# Patient Record
Sex: MALE | Race: WHITE | NOT HISPANIC OR LATINO | Employment: OTHER | URBAN - METROPOLITAN AREA
[De-identification: names, ages, dates, MRNs, and addresses within clinical notes are randomized per-mention and may not be internally consistent; named-entity substitution may affect disease eponyms.]

---

## 2017-09-16 ENCOUNTER — HOSPITAL ENCOUNTER (OUTPATIENT)
Age: 77
Discharge: HOME | End: 2017-09-16
Attending: EMERGENCY MEDICINE

## 2017-09-16 VITALS
WEIGHT: 179 LBS | TEMPERATURE: 98.2 F | BODY MASS INDEX: 26.51 KG/M2 | RESPIRATION RATE: 16 BRPM | HEART RATE: 68 BPM | DIASTOLIC BLOOD PRESSURE: 84 MMHG | OXYGEN SATURATION: 96 % | HEIGHT: 69 IN | SYSTOLIC BLOOD PRESSURE: 149 MMHG

## 2017-09-16 DIAGNOSIS — L03.90 CELLULITIS, UNSPECIFIED CELLULITIS SITE: ICD-10-CM

## 2017-09-16 PROCEDURE — 99203 OFFICE O/P NEW LOW 30 MIN: CPT | Performed by: EMERGENCY MEDICINE

## 2017-09-16 PROCEDURE — 99201 HB OP SERV MINOR ACUITY-NEW PT: CPT

## 2017-09-16 RX ORDER — CEPHALEXIN 500 MG/1
500 CAPSULE ORAL 3 TIMES DAILY
Qty: 15 CAPSULE | Refills: 0 | Status: SHIPPED | OUTPATIENT
Start: 2017-09-16

## 2017-09-16 RX ORDER — PREDNISONE 20 MG/1
40 TABLET ORAL DAILY
Qty: 4 TABLET | Refills: 0 | Status: SHIPPED | OUTPATIENT
Start: 2017-09-16

## 2017-09-16 ASSESSMENT — PAIN SCALES - GENERAL: PAINLEVEL_OUTOF10: 0

## 2018-09-04 ENCOUNTER — HOSPITAL ENCOUNTER (EMERGENCY)
Facility: CLINIC | Age: 78
Discharge: HOME | End: 2018-09-04
Attending: EMERGENCY MEDICINE | Admitting: EMERGENCY MEDICINE
Payer: MEDICARE

## 2018-09-04 VITALS
WEIGHT: 180 LBS | DIASTOLIC BLOOD PRESSURE: 77 MMHG | HEIGHT: 69 IN | OXYGEN SATURATION: 97 % | SYSTOLIC BLOOD PRESSURE: 133 MMHG | BODY MASS INDEX: 26.66 KG/M2 | TEMPERATURE: 97.4 F | HEART RATE: 67 BPM | RESPIRATION RATE: 16 BRPM

## 2018-09-04 DIAGNOSIS — K57.32 DIVERTICULITIS OF COLON: ICD-10-CM

## 2018-09-04 LAB
ALBUMIN SERPL-MCNC: 4 G/DL (ref 3.4–5)
ALBUMIN UR-MCNC: 10 MG/DL
ALP SERPL-CCNC: 116 U/L (ref 40–150)
ALT SERPL W P-5'-P-CCNC: 93 U/L (ref 0–70)
ANION GAP SERPL CALCULATED.3IONS-SCNC: 10 MMOL/L (ref 3–14)
APPEARANCE UR: CLEAR
AST SERPL W P-5'-P-CCNC: 91 U/L (ref 0–45)
BASOPHILS # BLD AUTO: 0 10E9/L (ref 0–0.2)
BASOPHILS NFR BLD AUTO: 0.1 %
BILIRUB SERPL-MCNC: 0.7 MG/DL (ref 0.2–1.3)
BILIRUB UR QL STRIP: NEGATIVE
BUN SERPL-MCNC: 21 MG/DL (ref 7–30)
CALCIUM SERPL-MCNC: 9.1 MG/DL (ref 8.5–10.1)
CHLORIDE SERPL-SCNC: 109 MMOL/L (ref 94–109)
CO2 SERPL-SCNC: 26 MMOL/L (ref 20–32)
COLOR UR AUTO: YELLOW
CREAT SERPL-MCNC: 1.08 MG/DL (ref 0.66–1.25)
DIFFERENTIAL METHOD BLD: NORMAL
EOSINOPHIL # BLD AUTO: 0.1 10E9/L (ref 0–0.7)
EOSINOPHIL NFR BLD AUTO: 1.4 %
ERYTHROCYTE [DISTWIDTH] IN BLOOD BY AUTOMATED COUNT: 12.3 % (ref 10–15)
GFR SERPL CREATININE-BSD FRML MDRD: 66 ML/MIN/1.7M2
GLUCOSE SERPL-MCNC: 97 MG/DL (ref 70–99)
GLUCOSE UR STRIP-MCNC: NEGATIVE MG/DL
HCT VFR BLD AUTO: 46.5 % (ref 40–53)
HGB BLD-MCNC: 16 G/DL (ref 13.3–17.7)
HGB UR QL STRIP: NEGATIVE
IMM GRANULOCYTES # BLD: 0 10E9/L (ref 0–0.4)
IMM GRANULOCYTES NFR BLD: 0.2 %
KETONES UR STRIP-MCNC: 10 MG/DL
LACTATE BLD-SCNC: 1.4 MMOL/L (ref 0.7–2)
LEUKOCYTE ESTERASE UR QL STRIP: NEGATIVE
LIPASE SERPL-CCNC: 138 U/L (ref 73–393)
LYMPHOCYTES # BLD AUTO: 1.7 10E9/L (ref 0.8–5.3)
LYMPHOCYTES NFR BLD AUTO: 20.2 %
MCH RBC QN AUTO: 32.6 PG (ref 26.5–33)
MCHC RBC AUTO-ENTMCNC: 34.4 G/DL (ref 31.5–36.5)
MCV RBC AUTO: 95 FL (ref 78–100)
MONOCYTES # BLD AUTO: 1 10E9/L (ref 0–1.3)
MONOCYTES NFR BLD AUTO: 11.9 %
MUCOUS THREADS #/AREA URNS LPF: PRESENT /LPF
NEUTROPHILS # BLD AUTO: 5.6 10E9/L (ref 1.6–8.3)
NEUTROPHILS NFR BLD AUTO: 66.2 %
NITRATE UR QL: NEGATIVE
NRBC # BLD AUTO: 0 10*3/UL
NRBC BLD AUTO-RTO: 0 /100
PH UR STRIP: 6 PH (ref 5–7)
PLATELET # BLD AUTO: 200 10E9/L (ref 150–450)
POTASSIUM SERPL-SCNC: 3.5 MMOL/L (ref 3.4–5.3)
PROT SERPL-MCNC: 7.4 G/DL (ref 6.8–8.8)
RBC # BLD AUTO: 4.91 10E12/L (ref 4.4–5.9)
RBC #/AREA URNS AUTO: 1 /HPF (ref 0–2)
SODIUM SERPL-SCNC: 145 MMOL/L (ref 133–144)
SOURCE: ABNORMAL
SP GR UR STRIP: 1.01 (ref 1–1.03)
SQUAMOUS #/AREA URNS AUTO: <1 /HPF (ref 0–1)
UROBILINOGEN UR STRIP-MCNC: NORMAL MG/DL (ref 0–2)
WBC # BLD AUTO: 8.4 10E9/L (ref 4–11)
WBC #/AREA URNS AUTO: 1 /HPF (ref 0–5)

## 2018-09-04 PROCEDURE — 25000132 ZZH RX MED GY IP 250 OP 250 PS 637: Mod: GY | Performed by: EMERGENCY MEDICINE

## 2018-09-04 PROCEDURE — 25000128 H RX IP 250 OP 636: Performed by: EMERGENCY MEDICINE

## 2018-09-04 PROCEDURE — 96360 HYDRATION IV INFUSION INIT: CPT

## 2018-09-04 PROCEDURE — 83690 ASSAY OF LIPASE: CPT | Performed by: EMERGENCY MEDICINE

## 2018-09-04 PROCEDURE — 81001 URINALYSIS AUTO W/SCOPE: CPT | Performed by: EMERGENCY MEDICINE

## 2018-09-04 PROCEDURE — 99284 EMERGENCY DEPT VISIT MOD MDM: CPT | Mod: 25

## 2018-09-04 PROCEDURE — 80053 COMPREHEN METABOLIC PANEL: CPT | Performed by: EMERGENCY MEDICINE

## 2018-09-04 PROCEDURE — 96361 HYDRATE IV INFUSION ADD-ON: CPT

## 2018-09-04 PROCEDURE — 83605 ASSAY OF LACTIC ACID: CPT | Performed by: EMERGENCY MEDICINE

## 2018-09-04 PROCEDURE — 85025 COMPLETE CBC W/AUTO DIFF WBC: CPT | Performed by: EMERGENCY MEDICINE

## 2018-09-04 RX ORDER — DICYCLOMINE HCL 20 MG
20 TABLET ORAL EVERY 6 HOURS
Qty: 40 TABLET | Refills: 0 | Status: SHIPPED | OUTPATIENT
Start: 2018-09-04 | End: 2018-09-14

## 2018-09-04 RX ORDER — DICYCLOMINE HCL 20 MG
20 TABLET ORAL ONCE
Status: COMPLETED | OUTPATIENT
Start: 2018-09-04 | End: 2018-09-04

## 2018-09-04 RX ADMIN — DICYCLOMINE HYDROCHLORIDE 20 MG: 20 TABLET ORAL at 13:00

## 2018-09-04 RX ADMIN — SODIUM CHLORIDE 1000 ML: 9 INJECTION, SOLUTION INTRAVENOUS at 11:06

## 2018-09-04 ASSESSMENT — ENCOUNTER SYMPTOMS
CONSTIPATION: 1
SHORTNESS OF BREATH: 0
APPETITE CHANGE: 1
ABDOMINAL PAIN: 1
DYSURIA: 0
LIGHT-HEADEDNESS: 0
FEVER: 0
BLOOD IN STOOL: 0

## 2018-09-04 NOTE — ED NOTES
Bed: ED19  Expected date:   Expected time:   Means of arrival:   Comments:  Hold triage , diverticulitis, unable to eat ir drink for days

## 2018-09-04 NOTE — ED AVS SNAPSHOT
Emergency Department    [address] [place] [address]    Phone:  [phone]    Fax:  [phone]                                       Abimael Heredia   MRN: [id number]    Department:   Emergency Department   Date of Visit:  9/4/2018           After Visit Summary Signature Page     I have received my discharge instructions, and my questions have been answered. I have discussed any challenges I see with this plan with the nurse or doctor.    ..........................................................................................................................................  Patient/Patient Representative Signature      ..........................................................................................................................................  Patient Representative Print Name and Relationship to Patient    ..................................................               ................................................  Date                                            Time    ..........................................................................................................................................  Reviewed by Signature/Title    ...................................................              ..............................................  Date                                                            Time          22EPIC Rev 08/18

## 2018-09-04 NOTE — ED AVS SNAPSHOT
Emergency Department    [address] [facility] [address]    Phone:  [phone]    Fax:  [phone]                                       Abimael Heredia   MRN: [id number]    Department:   Emergency Department   Date of Visit:  9/4/2018           Patient Information     Date Of Birth          1940        Your diagnoses for this visit were:     Diverticulitis of colon        You were seen by Lorne Deng MD.      Follow-up Information     Follow up with  Emergency Department.    Specialty:  EMERGENCY MEDICINE    Why:  As needed    Contact information:    [address] [facility] [address] [phone]        Follow up with Clinic, [facility]. Call in 2 days.    Contact information:    [address] [place] [address] [phone]          Discharge Instructions       Take the below medications as prescribed.      New Prescriptions    DICYCLOMINE (BENTYL) 20 MG TABLET    Take 1 tablet (20 mg) by mouth every 6 hours for 10 days         1. -Take acetaminophen 500 to 1000 mg by mouth every 4 to 6 hours as needed for pain or fever.  Do not take more than 4000 mg in 24 hours.  Do not take within 6 hours of another acetaminophen containing medication such as norco (vicodin) or percocet.  2. Drink plenty of fluids such as diluted gatorade.  3. Please follow-up with your primary care doctor if your symptoms persist after you return home.  4. Please return to the ED as needed for new or worsening symptoms such as fainting, vomiting and unable to keep anything down, fever greater than 100.4 F, bloody bowel movements or vomit, any other concerning symptoms.      Discharge Instructions  Diverticulitis  Your provider has diagnosed you with diverticulitis.  Diverticulitis is an infection of a diverticulum, which is a tiny sack-like structure that protrudes off the wall of the colon (large intestine).  These sacks are created over years of increased  pressure in the colon (this is diverticulosis), usually as a result of a diet without enough fruits, vegetables, and whole grains. Because these sacks are small, bacteria can get trapped inside them and cause an infection (this is divericulitis).  This infection often causes abdominal (belly) pain, fever, nausea (sick to stomach), and vomiting (throwing up). Diverticulitis is usually treated at home.  However, sometimes diverticulitis needs treatment in the hospital and may even need surgery.    Generally, every Emergency Department visit should have a follow-up clinic visit with either a primary or a specialty clinic/provider. Please follow-up as instructed by your emergency provider today.    Return to the Emergency Department if:     You get an oral temperature above 102oF or as directed by your provider.    You have blood in your stools (bright red or black, tarry stools), or have blood in your vomit.    You keep vomiting or cannot drink liquids or cannot keep your medicine down.    You cannot have a bowel movement or you cannot pass gas.    Your stomach gets bloated or bigger.    You faint or become very weak.    Your pain is too bad to tolerate.    You have new symptoms or anything that worries you.    What can I do to help myself?    Fill any antibiotic prescriptions the provider gave you and take them right away. Be sure to finish the whole antibiotic prescription.    For the first day or two at home drink only clear liquids.  This lets your intestines rest.    If your pain has improved after one or two days, you may start eating mild foods. Soda crackers, toast, plain noodles, gelatin, applesauce and bananas are good first choices.  Avoid foods that have acid, are spicy, fatty or fibrous (such as meats, coarse grains, vegetables). You may start eating these foods again in about 3-4 days when you are better.    Once you are back to normal, eat a high fiber diet of fruits, vegetables and whole grains. Some  "people think you should avoid eating nuts, seeds, and corn, but there is no definite proof this makes any difference in whether you will get diverticulitis again.     Pain and fever can be treated with Tylenol  (acetaminophen) or you might have been prescribed a pain medication.    Probiotics: If you have been given an antibiotic, you may want to also take a probiotic pill or eat yogurt with live cultures. Probiotics have \"good bacteria\" to help your intestines stay healthy. Studies have shown that probiotics help prevent diarrhea and other intestine problems (including C. diff infection) when you take antibiotics. You can buy these without a prescription in the pharmacy section of the store.  If you were given a prescription for medicine here today, be sure to read all of the information (including the package insert) that comes with your prescription.  This will include important information about the medicine, its side effects, and any warnings that you need to know about.  The pharmacist who fills the prescription can provide more information and answer questions you may have about the medicine.  If you have questions or concerns that the pharmacist cannot address, please call or return to the Emergency Department.     Remember that you can always come back to the Emergency Department if you are not able to see your regular provider in the amount of time listed above, if you get any new symptoms, or if there is anything that worries you.      Discharge References/Attachments     (S) DIET FOR DIVERTICULAR DISEASE (ENGLISH)      24 Hour Appointment Hotline       To make an appointment at any [facility] clinic, call [phone] ([phone]). If you don't have a family doctor or clinic, we will help you find one. [facility] clinics are conveniently located to serve the needs of you and your family.             Review of your medicines      START taking        Dose / Directions Last dose taken    dicyclomine 20 MG " tablet   Commonly known as:  BENTYL   Dose:  20 mg   Quantity:  40 tablet        Take 1 tablet (20 mg) by mouth every 6 hours for 10 days   Refills:  0          Our records show that you are taking the medicines listed below. If these are incorrect, please call your family doctor or clinic.        Dose / Directions Last dose taken    ASPIRIN PO   Dose:  81 mg        Take 81 mg by mouth   Refills:  0        VITAMIN D (CHOLECALCIFEROL) PO        Take by mouth daily   Refills:  0                Prescriptions were sent or printed at these locations (1 Prescription)                   Other Prescriptions                Printed at Department/Unit printer (1 of 1)         dicyclomine (BENTYL) 20 MG tablet                Procedures and tests performed during your visit     CBC with platelets differential    Comprehensive metabolic panel    Lactic acid whole blood    Lipase    UA with Microscopic      Orders Needing Specimen Collection     None      Pending Results     No orders found from 9/2/2018 to 9/5/2018.            Pending Culture Results     No orders found from 9/2/2018 to 9/5/2018.            Pending Results Instructions     If you had any lab results that were not finalized at the time of your Discharge, you can call the ED Lab Result RN at [phone]. You will be contacted by this team for any positive Lab results or changes in treatment. The nurses are available 7 days a week from 10A to 6:30P.  You can leave a message 24 hours per day and they will return your call.        Test Results From Your Hospital Stay        9/4/2018 11:23 AM      Component Results     Component Value Ref Range & Units Status    WBC 8.4 4.0 - 11.0 10e9/L Final    RBC Count 4.91 4.4 - 5.9 10e12/L Final    Hemoglobin 16.0 13.3 - 17.7 g/dL Final    Hematocrit 46.5 40.0 - 53.0 % Final    MCV 95 78 - 100 fl Final    MCH 32.6 26.5 - 33.0 pg Final    MCHC 34.4 31.5 - 36.5 g/dL Final    RDW 12.3 10.0 - 15.0 % Final    Platelet Count 200 150 -  450 10e9/L Final    Diff Method Automated Method  Final    % Neutrophils 66.2 % Final    % Lymphocytes 20.2 % Final    % Monocytes 11.9 % Final    % Eosinophils 1.4 % Final    % Basophils 0.1 % Final    % Immature Granulocytes 0.2 % Final    Nucleated RBCs 0 0 /100 Final    Absolute Neutrophil 5.6 1.6 - 8.3 10e9/L Final    Absolute Lymphocytes 1.7 0.8 - 5.3 10e9/L Final    Absolute Monocytes 1.0 0.0 - 1.3 10e9/L Final    Absolute Eosinophils 0.1 0.0 - 0.7 10e9/L Final    Absolute Basophils 0.0 0.0 - 0.2 10e9/L Final    Abs Immature Granulocytes 0.0 0 - 0.4 10e9/L Final    Absolute Nucleated RBC 0.0  Final         9/4/2018 11:39 AM      Component Results     Component Value Ref Range & Units Status    Sodium 145 (H) 133 - 144 mmol/L Final    Potassium 3.5 3.4 - 5.3 mmol/L Final    Chloride 109 94 - 109 mmol/L Final    Carbon Dioxide 26 20 - 32 mmol/L Final    Anion Gap 10 3 - 14 mmol/L Final    Glucose 97 70 - 99 mg/dL Final    Urea Nitrogen 21 7 - 30 mg/dL Final    Creatinine 1.08 0.66 - 1.25 mg/dL Final    GFR Estimate 66 >60 mL/min/1.7m2 Final    Non  GFR Calc    GFR Estimate If Black 80 >60 mL/min/1.7m2 Final    African American GFR Calc    Calcium 9.1 8.5 - 10.1 mg/dL Final    Bilirubin Total 0.7 0.2 - 1.3 mg/dL Final    Albumin 4.0 3.4 - 5.0 g/dL Final    Protein Total 7.4 6.8 - 8.8 g/dL Final    Alkaline Phosphatase 116 40 - 150 U/L Final    ALT 93 (H) 0 - 70 U/L Final    AST 91 (H) 0 - 45 U/L Final         9/4/2018 11:36 AM      Component Results     Component Value Ref Range & Units Status    Lipase 138 73 - 393 U/L Final         9/4/2018  2:16 PM      Component Results     Component Value Ref Range & Units Status    Color Urine Yellow  Final    Appearance Urine Clear  Final    Glucose Urine Negative NEG^Negative mg/dL Final    Bilirubin Urine Negative NEG^Negative Final    Ketones Urine 10 (A) NEG^Negative mg/dL Final    Specific Gravity Urine 1.012 1.003 - 1.035 Final    Blood Urine  Negative NEG^Negative Final    pH Urine 6.0 5.0 - 7.0 pH Final    Protein Albumin Urine 10 (A) NEG^Negative mg/dL Final    Urobilinogen mg/dL Normal 0.0 - 2.0 mg/dL Final    Nitrite Urine Negative NEG^Negative Final    Leukocyte Esterase Urine Negative NEG^Negative Final    Source Midstream Urine  Final    WBC Urine 1 0 - 5 /HPF Final    RBC Urine 1 0 - 2 /HPF Final    Squamous Epithelial /HPF Urine <1 0 - 1 /HPF Final    Mucous Urine Present (A) NEG^Negative /LPF Final         9/4/2018 11:17 AM      Component Results     Component Value Ref Range & Units Status    Lactic Acid 1.4 0.7 - 2.0 mmol/L Final                Clinical Quality Measure: Blood Pressure Screening     Your blood pressure was checked while you were in the emergency department today. The last reading we obtained was  BP: (!) 158/97 . Please read the guidelines below about what these numbers mean and what you should do about them.  If your systolic blood pressure (the top number) is less than 120 and your diastolic blood pressure (the bottom number) is less than 80, then your blood pressure is normal. There is nothing more that you need to do about it.  If your systolic blood pressure (the top number) is 120-139 or your diastolic blood pressure (the bottom number) is 80-89, your blood pressure may be higher than it should be. You should have your blood pressure rechecked within a year by a primary care provider.  If your systolic blood pressure (the top number) is 140 or greater or your diastolic blood pressure (the bottom number) is 90 or greater, you may have high blood pressure. High blood pressure is treatable, but if left untreated over time it can put you at risk for heart attack, stroke, or kidney failure. You should have your blood pressure rechecked by a primary care provider within the next 4 weeks.  If your provider in the emergency department today gave you specific instructions to follow-up with your doctor or provider even sooner than  "that, you should follow that instruction and not wait for up to 4 weeks for your follow-up visit.        Thank you for choosing [facility]       Thank you for choosing [facility] for your care. Our goal is always to provide you with excellent care. Hearing back from our patients is one way we can continue to improve our services. Please take a few minutes to complete the written survey that you may receive in the mail after you visit with us. Thank you!        [employer] Information     [employer] lets you send messages to your doctor, view your test results, renew your prescriptions, schedule appointments and more. To sign up, go to [email or web address] . Click on \"Log in\" on the left side of the screen, which will take you to the Welcome page. Then click on \"Sign up Now\" on the right side of the page.     You will be asked to enter the access code listed below, as well as some personal information. Please follow the directions to create your username and password.     Your access code is: [email or web address]  Expires: 12/3/2018  2:37 PM     Your access code will  in 90 days. If you need help or a new code, please call your [facility] clinic or [phone].        Care EveryWhere ID     This is your Care EveryWhere ID. This could be used by other organizations to access your [facility] medical records  [id number]        Equal Access to Services     JOANIE NELSON : Papa fincho Sojosephine, waaxda luqadaha, qaybta kaalmada faye, susan otoole. So [facility] [phone].    ATENCIÓN: Si habla español, tiene a etienne disposición servicios gratuitos de asistencia lingüística. Llame al [phone].    We comply with applicable federal civil rights laws and [place] laws. We do not discriminate on the basis of race, color, national origin, age, disability, sex, sexual orientation, or gender identity.            After Visit Summary       This is your record. Keep this with you and show to your " community pharmacist(s) and doctor(s) at your next visit.

## 2018-09-04 NOTE — ED PROVIDER NOTES
History     Chief Complaint:  Abdominal Pain    HPI   Abimael Heredia is a 78 year old male, otherwise healthy, who presents with abdominal pain. The patient reports that he was seen just over a week ago for abdominal pain, after which a CT scan was obtained indicating diverticulosis per patient report. He was then prescribed ciprofloxacin and metronidazole antibiotics, which he has since been taking. The patient states that since the onset of his abdominal symptoms, he has not been able to eat or drink, noting that he drinks less than a glass of water a day, as he is only able to slowly sip on a glass of apple juice without abdominal pain and discomfort. He denies feeling light headed. In addition to this pain, the patient also endorses constipation, with a few small dark BMs. Due to these symptoms, the patient notes that he contacted his doctor, who recommended he present here for further evaluation prompting his visit today. He otherwise denies any other acute symptoms such as chest pain, shortness or breath, fever, rash, testicular pain or dysuria.     CT abdomen, 08/28:  Impression:  1. Diverticulitis of the descending colon without adjacent fluid collection or pneumoperitoneum.    Allergies:  No known drug allergies    Medications:    Flagyl  Cipro    Past Medical History:      Hyperlipidemia  Hypertension    Past Surgical History:    Naval hernia  Tonsillectomy    Family History:    History reviewed. No pertinent family history.     Social History:  The patient was accompanied to the ED by his wife.  Smoking Status: Former  Smokeless Tobacco: No  Alcohol Use: Yes  Marital status:      Review of Systems   Constitutional: Positive for appetite change. Negative for fever.   Respiratory: Negative for shortness of breath.    Cardiovascular: Negative for chest pain.   Gastrointestinal: Positive for abdominal pain and constipation. Negative for blood in stool.   Genitourinary: Negative for dysuria and  "testicular pain.   Neurological: Negative for light-headedness.   All other systems reviewed and are negative.    Physical Exam     Patient Vitals for the past 24 hrs:   BP Temp Temp src Pulse Resp SpO2 Height Weight   09/04/18 1145 - - - - - 95 % - -   09/04/18 1130 - - - - - 96 % - -   09/04/18 1115 - - - - - 96 % - -   09/04/18 1100 - - - - - 94 % - -   09/04/18 1045 (!) 171/107 97.4  F (36.3  C) Oral 67 18 97 % 1.753 m (5' 9\") 81.6 kg (180 lb)       Physical Exam  Constitutional: Well developed, nontox appearance  Head: Atraumatic.   Mouth/Throat: Oropharynx is clear and moist.   Neck:  no stridor  Eyes: no scleral icterus  Cardiovascular: RRR, 2+ bilat radial, DP pulses  Pulmonary/Chest: nml resp effort, Clear BS bilat  Abdominal: ND, +BS, soft, LLQ and suprapubic tenderness, no rebound or guarding   : no CVA tenderness bilat. penis normal: no testicular edema, erythema or tenderness, no hernias palpated  Ext: Warm, well perfused, no edema  Neurological: A&O, symmetric facies, moves ext x4  Skin: Skin is warm and dry.   Psychiatric: Behavior is normal. Thought content normal.   Nursing note and vitals reviewed.    Emergency Department Course     Laboratory:  Laboratory findings were communicated with the patient who voiced understanding of the findings.    CBC: WNL (WBC 8.4, HGB 16.0, )  BMP:  (H), ALT 93 (H), AST 91 (H), o/w  WNL (Creatinine 1.08)  Lipase: 138  Lactic Acid: 1.4    UA: Yellow and clear, ketone 10, albumin 10, mucous present, o/w Negative    Interventions:  1106 - NS Bolus 1,000mL IV  1300 - Bentyl 20 mg PO    Emergency Department Course:  Nursing notes and vitals reviewed.    IV was inserted and blood was drawn for laboratory testing, results above.    The patient provided a urine sample here in the emergency department. This was sent for laboratory testing, findings above.    1049: I performed an exam of the patient as documented above.   1327: Patient rechecked and updated. "     Findings and plan explained to the Patient. Patient discharged home with instructions regarding supportive care, medications, and reasons to return. The importance of close follow-up was reviewed.     Impression & Plan      Medical Decision Makin year old male presenting w/ abd pain     DDx includes diverticulitis, pain secondary to diverticulitis, dehydration, electrolyte abnormality, acute kidney injury.  Previous CT scan reviewed in care everywhere.  Doubt abdominal aortic pathology given previous CT scan, known diverticulitis and overall improvement in pain and tenderness despite symptoms being worse with eating.  Repeat imaging deferred at this time.  Will await labs and if concerning findings will potentially order repeat CT scan to evaluate for complications.  Labs ordered as noted above.  Labs significant for minimal hypernatremia, lactic acid normal, white count unchanged from previous and within normal limits, creatinine mildly elevated from previous value of 0.4 but not consistent with acute kidney injury.  Given the patient's reassuring labs, report improvement in symptoms, decreased tenderness on exam per patient, I do not think he warrants repeat CT scan at this time given complications such as perforation or intra-abdominal abscess seem less likely given overall improvement of the patient's clinical picture.  Pt given bentyl and then PO challenge in ED and was able to tolerate without significant pain.  Recommendations given regarding follow up with primary care doctor and return to the emergency department as needed for new or worsening symptoms.  Counseled on all results, disposition and diagnosis.  Pt understanding and agreeable to plan. Patient discharged in stable condition.      Diagnosis:    ICD-10-CM    1. Diverticulitis of colon K57.32        Disposition:  discharged to home    Discharge Medication List as of 2018  2:37 PM      START taking these medications    Details    dicyclomine (BENTYL) 20 MG tablet Take 1 tablet (20 mg) by mouth every 6 hours for 10 days, Disp-40 tablet, R-0, Local Print             Gia Galvez  9/4/2018    EMERGENCY DEPARTMENT  I, Gia Galvez, santo serving as a scribe at 10:49 AM on 9/4/2018 to document services personally performed by Lorne Deng MD based on my observations and the provider's statements to me.       Lorne Deng MD  09/04/18 8613

## 2018-09-04 NOTE — DISCHARGE INSTRUCTIONS
Take the below medications as prescribed.      New Prescriptions    DICYCLOMINE (BENTYL) 20 MG TABLET    Take 1 tablet (20 mg) by mouth every 6 hours for 10 days         1. -Take acetaminophen 500 to 1000 mg by mouth every 4 to 6 hours as needed for pain or fever.  Do not take more than 4000 mg in 24 hours.  Do not take within 6 hours of another acetaminophen containing medication such as norco (vicodin) or percocet.  2. Drink plenty of fluids such as diluted gatorade.  3. Please follow-up with your primary care doctor if your symptoms persist after you return home.  4. Please return to the ED as needed for new or worsening symptoms such as fainting, vomiting and unable to keep anything down, fever greater than 100.4 F, bloody bowel movements or vomit, any other concerning symptoms.      Discharge Instructions  Diverticulitis  Your provider has diagnosed you with diverticulitis.  Diverticulitis is an infection of a diverticulum, which is a tiny sack-like structure that protrudes off the wall of the colon (large intestine).  These sacks are created over years of increased pressure in the colon (this is diverticulosis), usually as a result of a diet without enough fruits, vegetables, and whole grains. Because these sacks are small, bacteria can get trapped inside them and cause an infection (this is divericulitis).  This infection often causes abdominal (belly) pain, fever, nausea (sick to stomach), and vomiting (throwing up). Diverticulitis is usually treated at home.  However, sometimes diverticulitis needs treatment in the hospital and may even need surgery.    Generally, every Emergency Department visit should have a follow-up clinic visit with either a primary or a specialty clinic/provider. Please follow-up as instructed by your emergency provider today.    Return to the Emergency Department if:     You get an oral temperature above 102oF or as directed by your provider.    You have blood in your stools (bright  "red or black, tarry stools), or have blood in your vomit.    You keep vomiting or cannot drink liquids or cannot keep your medicine down.    You cannot have a bowel movement or you cannot pass gas.    Your stomach gets bloated or bigger.    You faint or become very weak.    Your pain is too bad to tolerate.    You have new symptoms or anything that worries you.    What can I do to help myself?    Fill any antibiotic prescriptions the provider gave you and take them right away. Be sure to finish the whole antibiotic prescription.    For the first day or two at home drink only clear liquids.  This lets your intestines rest.    If your pain has improved after one or two days, you may start eating mild foods. Soda crackers, toast, plain noodles, gelatin, applesauce and bananas are good first choices.  Avoid foods that have acid, are spicy, fatty or fibrous (such as meats, coarse grains, vegetables). You may start eating these foods again in about 3-4 days when you are better.    Once you are back to normal, eat a high fiber diet of fruits, vegetables and whole grains. Some people think you should avoid eating nuts, seeds, and corn, but there is no definite proof this makes any difference in whether you will get diverticulitis again.     Pain and fever can be treated with Tylenol  (acetaminophen) or you might have been prescribed a pain medication.    Probiotics: If you have been given an antibiotic, you may want to also take a probiotic pill or eat yogurt with live cultures. Probiotics have \"good bacteria\" to help your intestines stay healthy. Studies have shown that probiotics help prevent diarrhea and other intestine problems (including C. diff infection) when you take antibiotics. You can buy these without a prescription in the pharmacy section of the store.  If you were given a prescription for medicine here today, be sure to read all of the information (including the package insert) that comes with your " prescription.  This will include important information about the medicine, its side effects, and any warnings that you need to know about.  The pharmacist who fills the prescription can provide more information and answer questions you may have about the medicine.  If you have questions or concerns that the pharmacist cannot address, please call or return to the Emergency Department.     Remember that you can always come back to the Emergency Department if you are not able to see your regular provider in the amount of time listed above, if you get any new symptoms, or if there is anything that worries you.

## 2024-01-13 ENCOUNTER — HOSPITAL ENCOUNTER (EMERGENCY)
Facility: CLINIC | Age: 84
Discharge: HOME | End: 2024-01-13
Attending: EMERGENCY MEDICINE | Admitting: EMERGENCY MEDICINE
Payer: COMMERCIAL

## 2024-01-13 ENCOUNTER — APPOINTMENT (OUTPATIENT)
Dept: CT IMAGING | Facility: CLINIC | Age: 84
End: 2024-01-13
Attending: EMERGENCY MEDICINE
Payer: COMMERCIAL

## 2024-01-13 VITALS
SYSTOLIC BLOOD PRESSURE: 162 MMHG | WEIGHT: 180 LBS | OXYGEN SATURATION: 98 % | BODY MASS INDEX: 26.58 KG/M2 | DIASTOLIC BLOOD PRESSURE: 90 MMHG | RESPIRATION RATE: 20 BRPM | TEMPERATURE: 97.8 F | HEART RATE: 68 BPM

## 2024-01-13 DIAGNOSIS — R55 VASOVAGAL SYNCOPE: ICD-10-CM

## 2024-01-13 DIAGNOSIS — I25.10 ATHEROSCLEROSIS OF NATIVE CORONARY ARTERY OF NATIVE HEART WITHOUT ANGINA PECTORIS: ICD-10-CM

## 2024-01-13 DIAGNOSIS — R10.9 ABDOMINAL PAIN, UNSPECIFIED ABDOMINAL LOCATION: ICD-10-CM

## 2024-01-13 DIAGNOSIS — R03.0 ELEVATED BLOOD PRESSURE READING WITHOUT DIAGNOSIS OF HYPERTENSION: ICD-10-CM

## 2024-01-13 DIAGNOSIS — I71.21 ANEURYSM OF ASCENDING AORTA WITHOUT RUPTURE (H): ICD-10-CM

## 2024-01-13 DIAGNOSIS — N40.0 ENLARGED PROSTATE: ICD-10-CM

## 2024-01-13 LAB
ALBUMIN SERPL BCG-MCNC: 4.2 G/DL (ref 3.5–5.2)
ALBUMIN UR-MCNC: NEGATIVE MG/DL
ALP SERPL-CCNC: 112 U/L (ref 40–150)
ALT SERPL W P-5'-P-CCNC: 21 U/L (ref 0–70)
ANION GAP SERPL CALCULATED.3IONS-SCNC: 11 MMOL/L (ref 7–15)
APPEARANCE UR: CLEAR
AST SERPL W P-5'-P-CCNC: 18 U/L (ref 0–45)
ATRIAL RATE - MUSE: 65 BPM
BASOPHILS # BLD AUTO: 0 10E3/UL (ref 0–0.2)
BASOPHILS NFR BLD AUTO: 0 %
BILIRUB SERPL-MCNC: 1 MG/DL
BILIRUB UR QL STRIP: NEGATIVE
BUN SERPL-MCNC: 13.8 MG/DL (ref 8–23)
CALCIUM SERPL-MCNC: 9 MG/DL (ref 8.8–10.2)
CHLORIDE SERPL-SCNC: 105 MMOL/L (ref 98–107)
COLOR UR AUTO: ABNORMAL
CREAT SERPL-MCNC: 0.75 MG/DL (ref 0.67–1.17)
DEPRECATED HCO3 PLAS-SCNC: 25 MMOL/L (ref 22–29)
DIASTOLIC BLOOD PRESSURE - MUSE: NORMAL MMHG
EGFRCR SERPLBLD CKD-EPI 2021: 90 ML/MIN/1.73M2
EOSINOPHIL # BLD AUTO: 0.1 10E3/UL (ref 0–0.7)
EOSINOPHIL NFR BLD AUTO: 2 %
ERYTHROCYTE [DISTWIDTH] IN BLOOD BY AUTOMATED COUNT: 12.2 % (ref 10–15)
GLUCOSE SERPL-MCNC: 96 MG/DL (ref 70–99)
GLUCOSE UR STRIP-MCNC: NEGATIVE MG/DL
HCT VFR BLD AUTO: 42.8 % (ref 40–53)
HGB BLD-MCNC: 14.7 G/DL (ref 13.3–17.7)
HGB UR QL STRIP: NEGATIVE
IMM GRANULOCYTES # BLD: 0 10E3/UL
IMM GRANULOCYTES NFR BLD: 0 %
INTERPRETATION ECG - MUSE: NORMAL
KETONES UR STRIP-MCNC: NEGATIVE MG/DL
LACTATE SERPL-SCNC: 1.2 MMOL/L (ref 0.7–2)
LEUKOCYTE ESTERASE UR QL STRIP: NEGATIVE
LIPASE SERPL-CCNC: 20 U/L (ref 13–60)
LYMPHOCYTES # BLD AUTO: 2 10E3/UL (ref 0.8–5.3)
LYMPHOCYTES NFR BLD AUTO: 26 %
MCH RBC QN AUTO: 31.7 PG (ref 26.5–33)
MCHC RBC AUTO-ENTMCNC: 34.3 G/DL (ref 31.5–36.5)
MCV RBC AUTO: 92 FL (ref 78–100)
MONOCYTES # BLD AUTO: 0.9 10E3/UL (ref 0–1.3)
MONOCYTES NFR BLD AUTO: 11 %
MUCOUS THREADS #/AREA URNS LPF: PRESENT /LPF
NEUTROPHILS # BLD AUTO: 4.8 10E3/UL (ref 1.6–8.3)
NEUTROPHILS NFR BLD AUTO: 61 %
NITRATE UR QL: NEGATIVE
NRBC # BLD AUTO: 0 10E3/UL
NRBC BLD AUTO-RTO: 0 /100
P AXIS - MUSE: 13 DEGREES
PH UR STRIP: 6 [PH] (ref 5–7)
PLATELET # BLD AUTO: 161 10E3/UL (ref 150–450)
POTASSIUM SERPL-SCNC: 3.8 MMOL/L (ref 3.4–5.3)
PR INTERVAL - MUSE: 168 MS
PROT SERPL-MCNC: 6.4 G/DL (ref 6.4–8.3)
QRS DURATION - MUSE: 72 MS
QT - MUSE: 382 MS
QTC - MUSE: 397 MS
R AXIS - MUSE: 26 DEGREES
RBC # BLD AUTO: 4.63 10E6/UL (ref 4.4–5.9)
RBC URINE: <1 /HPF
SODIUM SERPL-SCNC: 141 MMOL/L (ref 135–145)
SP GR UR STRIP: 1.02 (ref 1–1.03)
SYSTOLIC BLOOD PRESSURE - MUSE: NORMAL MMHG
T AXIS - MUSE: 24 DEGREES
TROPONIN T SERPL HS-MCNC: 9 NG/L
UROBILINOGEN UR STRIP-MCNC: NORMAL MG/DL
VENTRICULAR RATE- MUSE: 65 BPM
WBC # BLD AUTO: 7.9 10E3/UL (ref 4–11)
WBC URINE: 2 /HPF

## 2024-01-13 PROCEDURE — 81001 URINALYSIS AUTO W/SCOPE: CPT | Performed by: EMERGENCY MEDICINE

## 2024-01-13 PROCEDURE — 258N000003 HC RX IP 258 OP 636: Performed by: EMERGENCY MEDICINE

## 2024-01-13 PROCEDURE — 84484 ASSAY OF TROPONIN QUANT: CPT | Performed by: EMERGENCY MEDICINE

## 2024-01-13 PROCEDURE — 85025 COMPLETE CBC W/AUTO DIFF WBC: CPT | Performed by: EMERGENCY MEDICINE

## 2024-01-13 PROCEDURE — 99285 EMERGENCY DEPT VISIT HI MDM: CPT | Mod: 25

## 2024-01-13 PROCEDURE — 93005 ELECTROCARDIOGRAM TRACING: CPT

## 2024-01-13 PROCEDURE — 96361 HYDRATE IV INFUSION ADD-ON: CPT

## 2024-01-13 PROCEDURE — 36415 COLL VENOUS BLD VENIPUNCTURE: CPT | Performed by: EMERGENCY MEDICINE

## 2024-01-13 PROCEDURE — 83690 ASSAY OF LIPASE: CPT | Performed by: EMERGENCY MEDICINE

## 2024-01-13 PROCEDURE — 250N000011 HC RX IP 250 OP 636: Performed by: EMERGENCY MEDICINE

## 2024-01-13 PROCEDURE — 71260 CT THORAX DX C+: CPT

## 2024-01-13 PROCEDURE — 83605 ASSAY OF LACTIC ACID: CPT | Performed by: EMERGENCY MEDICINE

## 2024-01-13 PROCEDURE — 96360 HYDRATION IV INFUSION INIT: CPT | Mod: 59

## 2024-01-13 PROCEDURE — 80053 COMPREHEN METABOLIC PANEL: CPT | Performed by: EMERGENCY MEDICINE

## 2024-01-13 PROCEDURE — 250N000009 HC RX 250: Performed by: EMERGENCY MEDICINE

## 2024-01-13 RX ORDER — IOPAMIDOL 755 MG/ML
72 INJECTION, SOLUTION INTRAVASCULAR ONCE
Status: COMPLETED | OUTPATIENT
Start: 2024-01-13 | End: 2024-01-13

## 2024-01-13 RX ADMIN — IOPAMIDOL 72 ML: 755 INJECTION, SOLUTION INTRAVENOUS at 19:19

## 2024-01-13 RX ADMIN — SODIUM CHLORIDE 1000 ML: 9 INJECTION, SOLUTION INTRAVENOUS at 18:28

## 2024-01-13 RX ADMIN — SODIUM CHLORIDE 80 ML: 9 INJECTION, SOLUTION INTRAVENOUS at 19:19

## 2024-01-13 ASSESSMENT — ACTIVITIES OF DAILY LIVING (ADL): ADLS_ACUITY_SCORE: 35

## 2024-01-13 NOTE — ED TRIAGE NOTES
At 9am at home pt was sitting at table, had abd pain, felt sweaty, went to bathroom, sat on toilet and fell off toilet onto the floor, had a loc for approx 30 min, no injuries, no blood thinners, no further pain, went to  and sent here for evaluation. Ate soup for lunch after this without any problems. Hx of kidney stones     Triage Assessment (Adult)       Row Name 01/13/24 0764          Triage Assessment    Airway WDL WDL        Respiratory WDL    Respiratory WDL WDL        Cardiac WDL    Cardiac WDL WDL        Cognitive/Neuro/Behavioral WDL    Cognitive/Neuro/Behavioral WDL X

## 2024-01-14 NOTE — ED PROVIDER NOTES
History     Chief Complaint:  Abdominal Pain and Syncope       HPI   Abimael Heredia is a 83 year old male who arrives for evaluation of a syncopal episode.  The patient reports that he woke feeling normally this morning and ate a small breakfast of coffee and a sweet roll then at 9 AM he was sitting at the table doing a crossword puzzle with his wife when he felt the need to have a bowel movement so he stood up to walk to the bathroom and had onset of lower abdominal cramps and feeling very sweaty.  He sat on the toilet and had a small bowel movement and then he passed out when he stood up to get off the toilet.  He woke up on the ground and thinks he had a brief loss of consciousness but he does not think he hit his head.  He has no sore spots on his head and he does not have any headache or neck pain.  He reports that he felt weak when he was laying on the ground and he was lightheaded so he could not get up off the ground for about 30 minutes until his son came over and helped him get up.  He then started to feel better.  He ate lunch without any further abdominal pain.  He states he currently feels normally now without any nausea, vomiting, lightheadedness, diarrhea or abdominal pain.  He denies having any chest pain, shortness of breath, blood in his stool, melena.  He was feeling normal yesterday.  He denies any recent cough or cold symptoms, fevers or chills.  No UTI symptoms.      Independent Historian:   None - Patient Only    Review of External Notes:   none       Medications:    ASPIRIN PO  VITAMIN D, CHOLECALCIFEROL, PO        Past Medical History:    Past Medical History:   Diagnosis Date    Diverticulitis        Past Surgical History:    Past Surgical History:   Procedure Laterality Date    HERNIA REPAIR          Physical Exam   Patient Vitals for the past 24 hrs:   BP Temp Pulse Resp SpO2 Weight   01/13/24 1405 (!) 162/90 97.8  F (36.6  C) 68 20 98 % 81.6 kg (180 lb)        Physical  Exam  Nursing note and vitals reviewed.  Constitutional:  Appears well-developed and well-nourished.   HENT:   Head:    Atraumatic.   Mouth/Throat:   Oropharynx is clear and moist. No oropharyngeal exudate.   Eyes:    Pupils are equal, round, and reactive to light.   Neck:    Normal range of motion. Neck supple.      No tracheal deviation present. No thyromegaly present.   Cardiovascular:  Normal rate, regular rhythm, no murmur   Pulmonary/Chest: Breath sounds are clear and equal without wheezes or crackles.  Abdominal:   Soft. Bowel sounds are normal. Exhibits no distension and      no mass. There is no tenderness.      There is no rebound and no guarding. No palpable inguinal hernia.  Musculoskeletal:  Exhibits no edema.   Lymphadenopathy:  No cervical adenopathy.   Neurological:   Alert and oriented to person, place, and time. GCS 15.  CN 2-12 intact.  and proximal upper extremity strength strong and equal.  Bilateral lower extremity strength strong and equal, including strong dorsiflexion and plantarflexion strength.  Sensation intact and equal to the face, arms and legs.  No facial droop or weakness. Normal speech.  Follows commands and answers questions normally.    Skin:    Skin is warm and dry. No rash noted. No pallor.       Emergency Department Course   ECG  ECG taken at 14:01 on 1/13/24, ECG read at 18:00 on 1/13/24 by Dr. France Aranda  Normal sinus Rhythm with sinus arrhythmia  Ventricular Rate 65 bpm. IA interval 168 ms. QRS duration 72 ms. QT/QTc 382/397 ms. P-R-T axes 13 26 24.     Imaging:  CT Aortic Survey w Contrast   Final Result   IMPRESSION:   1.  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 No aortic dissection.   2.  Atherosclerotic vascular disease and coronary artery disease.   3.  Diverticulosis. No diverticulitis, colitis, or obstruction.   4.  Moderately enlarged prostate.                Laboratory:  Labs Ordered and Resulted from Time of ED  Arrival to Time of ED Departure   ROUTINE UA WITH MICROSCOPIC REFLEX TO CULTURE - Abnormal       Result Value    Color Urine Light Yellow      Appearance Urine Clear      Glucose Urine Negative      Bilirubin Urine Negative      Ketones Urine Negative      Specific Gravity Urine 1.021      Blood Urine Negative      pH Urine 6.0      Protein Albumin Urine Negative      Urobilinogen Urine Normal      Nitrite Urine Negative      Leukocyte Esterase Urine Negative      Mucus Urine Present (*)     RBC Urine <1      WBC Urine 2     LACTIC ACID WHOLE BLOOD - Normal    Lactic Acid 1.2     TROPONIN T, HIGH SENSITIVITY - Normal    Troponin T, High Sensitivity 9     COMPREHENSIVE METABOLIC PANEL - Normal    Sodium 141      Potassium 3.8      Carbon Dioxide (CO2) 25      Anion Gap 11      Urea Nitrogen 13.8      Creatinine 0.75      GFR Estimate 90      Calcium 9.0      Chloride 105      Glucose 96      Alkaline Phosphatase 112      AST 18      ALT 21      Protein Total 6.4      Albumin 4.2      Bilirubin Total 1.0     LIPASE - Normal    Lipase 20     CBC WITH PLATELETS AND DIFFERENTIAL    WBC Count 7.9      RBC Count 4.63      Hemoglobin 14.7      Hematocrit 42.8      MCV 92      MCH 31.7      MCHC 34.3      RDW 12.2      Platelet Count 161      % Neutrophils 61      % Lymphocytes 26      % Monocytes 11      % Eosinophils 2      % Basophils 0      % Immature Granulocytes 0      NRBCs per 100 WBC 0      Absolute Neutrophils 4.8      Absolute Lymphocytes 2.0      Absolute Monocytes 0.9      Absolute Eosinophils 0.1      Absolute Basophils 0.0      Absolute Immature Granulocytes 0.0      Absolute NRBCs 0.0          Procedures   None    Emergency Department Course & Assessments:      Interventions:  Medications   sodium chloride 0.9% BOLUS 1,000 mL (1,000 mLs Intravenous $New Bag 1/13/24 2041)        Assessments:  I rechecked the patient and discussed the findings and plan.  He is feeling normally.    Independent Interpretation  (X-rays, CTs, rhythm strip):  None    Consultations/Discussion of Management or Tests:  None        Social Determinants of Health affecting care:   None    Disposition:  The patient was discharged to home.     Impression & Plan        Medical Decision Making:  This patient describes symptoms consistent with a vasovagal syncopal episode due to lower abdominal cramps preceding have any bowel movement.  There does not appear to be any head injury to be concerned about.  He is feeling normally now.  CT scan of his aorta was performed to check for aortic aneurysm or diverticulitis or other cause of his abdominal pain and syncope and was unremarkable.  There was no sign of abdominal aneurysm or rupture, diverticulitis or other serious cause of his symptoms.  I discussed the incidental findings of the small aneurysm of his ascending thoracic aorta which did not show any sign of rupture or dissection with him.  I also discussed that atherosclerosis and CAD, enlarged prostate and diverticulosis with him.  I discussed with him that his blood pressure is moderately elevated here and in the setting of the mild ascending thoracic aneurysm he should follow-up with cardiology to discuss starting blood pressure medication and monitoring of his thoracic aneurysm as well as risk factor modification and evaluation of his coronary artery disease since he is at risk for MI.  He was also told to follow-up with his primary care doctor next week to discuss starting an antihypertensive medication also.  There was no sign of stroke or myocardial infarction at this time.  I did not have concern for pulmonary embolism.  I felt he could be safely discharged home.  He was told signs and symptoms to return for.  I offered to talk to his son over the phone but he declines this.    Diagnosis:    ICD-10-CM    1. Vasovagal syncope  R55 Adult Cardiology Eval  Referral      2. Abdominal pain, unspecified abdominal location  R10.9       3.  Aneurysm of ascending aorta without rupture (H24)  I71.21 Adult Cardiology Eval  Referral      4. Enlarged prostate  N40.0       5. Atherosclerosis of native coronary artery of native heart without angina pectoris  I25.10 Adult Cardiology Eval  Referral      6. Elevated blood pressure reading without diagnosis of hypertension  R03.0 Adult Cardiology Eval  Referral           Discharge Medications:  New Prescriptions    No medications on file        1/13/2024   France Aranda MD [place], Cheri Lee, MD  01/13/24 4412

## 2024-01-31 ENCOUNTER — OFFICE VISIT (OUTPATIENT)
Dept: CARDIOLOGY | Facility: CLINIC | Age: 84
End: 2024-01-31
Attending: EMERGENCY MEDICINE
Payer: COMMERCIAL

## 2024-01-31 VITALS
SYSTOLIC BLOOD PRESSURE: 167 MMHG | BODY MASS INDEX: 28.13 KG/M2 | DIASTOLIC BLOOD PRESSURE: 88 MMHG | HEART RATE: 79 BPM | HEIGHT: 68 IN | WEIGHT: 185.6 LBS | OXYGEN SATURATION: 97 %

## 2024-01-31 DIAGNOSIS — I10 ESSENTIAL HYPERTENSION: Primary | ICD-10-CM

## 2024-01-31 DIAGNOSIS — R03.0 ELEVATED BLOOD PRESSURE READING WITHOUT DIAGNOSIS OF HYPERTENSION: ICD-10-CM

## 2024-01-31 DIAGNOSIS — I71.21 ANEURYSM OF ASCENDING AORTA WITHOUT RUPTURE (H): ICD-10-CM

## 2024-01-31 DIAGNOSIS — I25.10 ATHEROSCLEROSIS OF NATIVE CORONARY ARTERY OF NATIVE HEART WITHOUT ANGINA PECTORIS: ICD-10-CM

## 2024-01-31 DIAGNOSIS — R55 VASOVAGAL SYNCOPE: ICD-10-CM

## 2024-01-31 PROCEDURE — 99204 OFFICE O/P NEW MOD 45 MIN: CPT | Performed by: INTERNAL MEDICINE

## 2024-01-31 RX ORDER — AMLODIPINE BESYLATE 5 MG/1
5 TABLET ORAL DAILY
Qty: 90 TABLET | Refills: 3 | Status: SHIPPED | OUTPATIENT
Start: 2024-01-31 | End: 2024-03-18

## 2024-01-31 RX ORDER — PSEUDOEPHED/ACETAMINOPH/DIPHEN 30MG-500MG
TABLET ORAL
COMMUNITY
Start: 2024-01-22

## 2024-01-31 RX ORDER — ATORVASTATIN CALCIUM 20 MG/1
20 TABLET, FILM COATED ORAL AT BEDTIME
COMMUNITY

## 2024-01-31 NOTE — LETTER
1/31/2024    [facility]  [address] [address]    RE: Abimael Pope Giovanna       Dear Colleague,     I had the pleasure of seeing Abimael Heredia in the [facility].  CARDIOLOGY CLINIC CONSULTATION    PRIMARY CARE PHYSICIAN:  [facility]    HISTORY OF PRESENT ILLNESS:  The patient is a very pleasant 83-year-old gentleman with hypertension (untreated) and recently diagnosed ascending aortic dilatation and coronary atherosclerosis who is here to establish cardiac care.    He was seen in the emergency department earlier this month with vasovagal syncope.  He has CTA of the chest during that ER visit which showed no aortic dissection but evidence of mild ascending aortic dilatation (measuring 4.1 cm) and coronary atherosclerosis.  His blood pressure was elevated during his ER visit.  He was asked to follow-up with us given 71-year-old dilatation and elevated blood pressures.    He is not very active and does not exercise regularly.  However, with his usual activities he does not endorse chest pain or exertional shortness of breath.    PAST MEDICAL HISTORY:  Past Medical History:   Diagnosis Date    Diverticulitis        MEDICATIONS:  Current Outpatient Medications   Medication    acetaminophen (TYLENOL) 500 MG tablet    amLODIPine (NORVASC) 5 MG tablet    atorvastatin (LIPITOR) 20 MG tablet    omeprazole (PRILOSEC) 20 MG DR capsule    VITAMIN D, CHOLECALCIFEROL, PO    ASPIRIN PO     No current facility-administered medications for this visit.       SOCIAL HISTORY:  I have reviewed this patient's social history and updated it with pertinent information if needed. Abimael Heredia  reports that he has quit smoking. His smoking use included cigarettes. He has never used smokeless tobacco. He reports current alcohol use of about 7.0 standard drinks of alcohol per week. He reports that he does not use drugs.    PHYSICAL EXAM:  Pulse:  [79] 79  BP:  (160-167)/(88-97) 167/88  SpO2:  [97 %] 97 %  185 lbs 9.6 oz    Constitutional: alert, no distress  Respiratory: Good bilateral air entry  Cardiovascular: Regular rate and rhythm, no murmurs  GI: nondistended  Neuropsychiatric: appropriate affact    ASSESSMENT: Pertinent issues addressed/ reviewed during this cardiology visit  Mild ascending aortic dilatation  Hypertension.   Recent syncope, likely vasovagal    RECOMMENDATIONS:  Given recent syncope, recommend echocardiogram to rule out structural cardiac abnormalities.  Start amlodipine 5 mg daily for hypertension.  Asked to monitor blood pressure at home.  If BP systolic is greater than 140 consistently, will increase amlodipine to 7.5 mg daily.  He is asymptomatic from coronary disease point of view.  Continue statin and low-dose aspirin    It was a pleasure seeing this patient in clinic today. Please do not hesitate to contact me with any future questions.     Ramón Fowler MD, [facility]  Cardiology - [facility]  January 31, 2024    Review of the result(s) of each unique test - Last ECG, CTA of the chest, BMP     The level of medical decision making during this visit was of moderate complexity.    This note was completed in part using dictation via the Dragon voice recognition software. Some word and grammatical errors may occur and must be interpreted in the appropriate clinical context.  If there are any questions pertaining to this issue, please contact me for further clarification.      Thank you for allowing me to participate in the care of your patient.      Sincerely,     Ramón Fowler MD, MD     [facility]  cc:   France Aranda MD  [facility]  [address] [facility] [address]  [place] [address]

## 2024-01-31 NOTE — PROGRESS NOTES
CARDIOLOGY CLINIC CONSULTATION    [facility]    HISTORY OF PRESENT ILLNESS:  The patient is a very pleasant 83-year-old gentleman with hypertension (untreated) and recently diagnosed ascending aortic dilatation and coronary atherosclerosis who is here to establish cardiac care.    He was seen in the emergency department earlier this month with vasovagal syncope.  He has CTA of the chest during that ER visit which showed no aortic dissection but evidence of mild ascending aortic dilatation (measuring 4.1 cm) and coronary atherosclerosis.  His blood pressure was elevated during his ER visit.  He was asked to follow-up with us given 71-year-old dilatation and elevated blood pressures.    He is not very active and does not exercise regularly.  However, with his usual activities he does not endorse chest pain or exertional shortness of breath.    PAST MEDICAL HISTORY:  Past Medical History:   Diagnosis Date    Diverticulitis        MEDICATIONS:  Current Outpatient Medications   Medication    acetaminophen (TYLENOL) 500 MG tablet    amLODIPine (NORVASC) 5 MG tablet    atorvastatin (LIPITOR) 20 MG tablet    omeprazole (PRILOSEC) 20 MG DR capsule    VITAMIN D, CHOLECALCIFEROL, PO    ASPIRIN PO     No current facility-administered medications for this visit.       SOCIAL HISTORY:  I have reviewed this patient's social history and updated it with pertinent information if needed. Abimael Gonzalesrhiannon  reports that he has quit smoking. His smoking use included cigarettes. He has never used smokeless tobacco. He reports current alcohol use of about 7.0 standard drinks of alcohol per week. He reports that he does not use drugs.    PHYSICAL EXAM:  Pulse:  [79] 79  BP: (160-167)/(88-97) 167/88  SpO2:  [97 %] 97 %  185 lbs 9.6 oz    Constitutional: alert, no distress  Respiratory: Good bilateral air entry  Cardiovascular: Regular rate and rhythm, no murmurs  GI: nondistended  Neuropsychiatric:  appropriate affact    ASSESSMENT: Pertinent issues addressed/ reviewed during this cardiology visit  Mild ascending aortic dilatation  Hypertension.   Recent syncope, likely vasovagal    RECOMMENDATIONS:  Given recent syncope, recommend echocardiogram to rule out structural cardiac abnormalities.  Start amlodipine 5 mg daily for hypertension.  Asked to monitor blood pressure at home.  If BP systolic is greater than 140 consistently, will increase amlodipine to 7.5 mg daily.  He is asymptomatic from coronary disease point of view.  Continue statin and low-dose aspirin    It was a pleasure seeing this patient in clinic today. Please do not hesitate to contact me with any future questions.     Ramón Fowler MD, [facility]  Cardiology - [facility]  January 31, 2024    Review of the result(s) of each unique test - Last ECG, CTA of the chest, BMP     The level of medical decision making during this visit was of moderate complexity.    This note was completed in part using dictation via the Dragon voice recognition software. Some word and grammatical errors may occur and must be interpreted in the appropriate clinical context.  If there are any questions pertaining to this issue, please contact me for further clarification.

## 2024-02-15 ENCOUNTER — HOSPITAL ENCOUNTER (OUTPATIENT)
Dept: CARDIOLOGY | Facility: CLINIC | Age: 84
Discharge: HOME | End: 2024-02-15
Attending: INTERNAL MEDICINE | Admitting: INTERNAL MEDICINE
Payer: COMMERCIAL

## 2024-02-15 DIAGNOSIS — I71.21 ANEURYSM OF ASCENDING AORTA WITHOUT RUPTURE (H): ICD-10-CM

## 2024-02-15 LAB — LVEF ECHO: NORMAL

## 2024-02-15 PROCEDURE — 255N000002 HC RX 255 OP 636: Performed by: INTERNAL MEDICINE

## 2024-02-15 PROCEDURE — 93306 TTE W/DOPPLER COMPLETE: CPT | Mod: 26 | Performed by: INTERNAL MEDICINE

## 2024-02-15 PROCEDURE — 999N000208 ECHOCARDIOGRAM COMPLETE

## 2024-02-15 RX ADMIN — HUMAN ALBUMIN MICROSPHERES AND PERFLUTREN 9 ML: 10; .22 INJECTION, SOLUTION INTRAVENOUS at 15:36

## 2024-02-27 DIAGNOSIS — I71.21 ANEURYSM OF ASCENDING AORTA WITHOUT RUPTURE (H): Primary | ICD-10-CM

## 2024-03-12 ENCOUNTER — TELEPHONE (OUTPATIENT)
Dept: CARDIOLOGY | Facility: CLINIC | Age: 84
End: 2024-03-12
Payer: COMMERCIAL

## 2024-03-12 DIAGNOSIS — I10 ESSENTIAL HYPERTENSION: Primary | ICD-10-CM

## 2024-03-12 NOTE — TELEPHONE ENCOUNTER
Attempted to call patient back regarding ankle swelling complaints to obtain more information. Voicemail left on mobile and home phone numbers. Provided direct number for call back.

## 2024-03-12 NOTE — TELEPHONE ENCOUNTER
[facility] Call Center    Phone Message    May a detailed message be left on voicemail: yes     Reason for Call: Medication Question or concern regarding medication   Prescription Clarification  Name of Medication: amLODIPine (NORVASC) 5 MG tablet   Prescribing Provider: Dr. Fowler   Pharmacy:   [id number] [address] [facility]      What on the order needs clarification? Pt's ankles have been swelling up since he started on the amLODIPine (NORVASC) 5 MG tablet.  Pt would like something different that doesn't cause this.  Slight pain with the swelling but swollen all day regardless of time       Action Taken: Message routed to:  [facility] cardio    Travel Screening: Not Applicable

## 2024-03-14 NOTE — TELEPHONE ENCOUNTER
Attempted to call patient for a second time regarding leg swelling on amlodipine for more information. No answer. Left voicemail for patient to call back on RN direct number.

## 2024-03-14 NOTE — TELEPHONE ENCOUNTER
Patient returned call from writer. Patient complaining of bilateral ankle/calf swelling that is causing some slight discomfort since starting amlodipine. Blood pressures at home have been 120s/70s. Last blood pressure checked today was 128/76. Educated patient to continue to check blood pressure 1-2 hours after taking blood pressure medication. Patient denies any shortness of breath, dizziness or chest pain. Patient willing to continue amlodipine in the interm until a new plan for blood pressure control is established. Will route to Dr. Fowler for further review.

## 2024-03-18 RX ORDER — LISINOPRIL 10 MG/1
10 TABLET ORAL DAILY
Qty: 90 TABLET | Refills: 3 | Status: SHIPPED | OUTPATIENT
Start: 2024-03-18

## 2024-03-18 NOTE — TELEPHONE ENCOUNTER
Received below message from Dr. Fowler. Called patient to discuss below recommendations. Patient educated to continue to take blood pressure 1-2 hours after taking lisinopril and to let us know if blood pressure persists over 140/80. Patient voiced understanding and in agreement with plan. New orders placed.       Ramón Fowler MD  [facility] Heart Structural Nurse1 hour ago (8:42 AM)       We can stop the amlodipine and switch to lisinopril 10 mg daily.

## 2025-02-19 ENCOUNTER — HOSPITAL ENCOUNTER (OUTPATIENT)
Dept: CARDIOLOGY | Facility: CLINIC | Age: 85
Discharge: HOME | End: 2025-02-19
Attending: INTERNAL MEDICINE
Payer: COMMERCIAL

## 2025-02-19 DIAGNOSIS — I71.21 ANEURYSM OF ASCENDING AORTA WITHOUT RUPTURE: ICD-10-CM

## 2025-02-19 LAB — LVEF ECHO: NORMAL

## 2025-02-19 PROCEDURE — 255N000002 HC RX 255 OP 636: Performed by: INTERNAL MEDICINE

## 2025-02-19 PROCEDURE — C8929 TTE W OR WO FOL WCON,DOPPLER: HCPCS

## 2025-02-19 PROCEDURE — 93306 TTE W/DOPPLER COMPLETE: CPT | Mod: 26 | Performed by: INTERNAL MEDICINE

## 2025-02-19 RX ADMIN — HUMAN ALBUMIN MICROSPHERES AND PERFLUTREN 9 ML: 10; .22 INJECTION, SOLUTION INTRAVENOUS at 13:20

## 2025-02-24 ENCOUNTER — OFFICE VISIT (OUTPATIENT)
Dept: CARDIOLOGY | Facility: CLINIC | Age: 85
End: 2025-02-24
Attending: INTERNAL MEDICINE
Payer: COMMERCIAL

## 2025-02-24 VITALS
DIASTOLIC BLOOD PRESSURE: 67 MMHG | WEIGHT: 185.1 LBS | HEART RATE: 65 BPM | BODY MASS INDEX: 28.05 KG/M2 | HEIGHT: 68 IN | SYSTOLIC BLOOD PRESSURE: 128 MMHG | OXYGEN SATURATION: 98 %

## 2025-02-24 DIAGNOSIS — I71.21 ANEURYSM OF ASCENDING AORTA WITHOUT RUPTURE: ICD-10-CM

## 2025-02-24 DIAGNOSIS — I10 ESSENTIAL HYPERTENSION: ICD-10-CM

## 2025-02-24 PROCEDURE — 99214 OFFICE O/P EST MOD 30 MIN: CPT | Performed by: INTERNAL MEDICINE

## 2025-02-24 PROCEDURE — G2211 COMPLEX E/M VISIT ADD ON: HCPCS | Performed by: INTERNAL MEDICINE

## 2025-02-24 RX ORDER — LISINOPRIL 10 MG/1
10 TABLET ORAL DAILY
Qty: 90 TABLET | Refills: 3 | Status: SHIPPED | OUTPATIENT
Start: 2025-02-24

## 2025-02-24 RX ORDER — ATORVASTATIN CALCIUM 20 MG/1
20 TABLET, FILM COATED ORAL AT BEDTIME
Qty: 90 TABLET | Refills: 3 | Status: SHIPPED | OUTPATIENT
Start: 2025-02-24

## 2025-02-24 NOTE — LETTER
2/24/2025    [facility]  [address] [address]    RE: Abimael Heredia       Dear Colleague,     I had the pleasure of seeing Abimael Heredia in the [facility].  CARDIOLOGY CLINIC CONSULTATION    PRIMARY CARE PHYSICIAN:  [facility]    HISTORY OF PRESENT ILLNESS:  The patient is a very pleasant 83-year-old gentleman with hypertension (untreated) and recently diagnosed ascending aortic dilatation and coronary atherosclerosis who is here for follow-up    He was seen in the emergency department last year with vasovagal syncope.  He has CTA of the chest during that ER visit which showed no aortic dissection but evidence of mild ascending aortic dilatation (measuring 4.1 cm) and coronary atherosclerosis.  His blood pressure was elevated during his ER visit.  He was subsequently asked to follow-up with us given aortic dilatation and elevated blood pressures.    He is not very active and does not exercise regularly.  However, with his usual activities he does not endorse chest pain or exertional shortness of breath.    PAST MEDICAL HISTORY:  Past Medical History:   Diagnosis Date     Diverticulitis        MEDICATIONS:  Current Outpatient Medications   Medication Sig Dispense Refill     acetaminophen (TYLENOL) 500 MG tablet take 1 tablet by mouth four times a day as needed for pain       atorvastatin (LIPITOR) 20 MG tablet Take 1 tablet (20 mg) by mouth at bedtime. 90 tablet 3     lisinopril (ZESTRIL) 10 MG tablet Take 1 tablet (10 mg) by mouth daily. 90 tablet 3     omeprazole (PRILOSEC) 20 MG DR capsule Take 1 capsule (20 mg) by mouth once daily.       VITAMIN D, CHOLECALCIFEROL, PO Take by mouth daily       No current facility-administered medications for this visit.       SOCIAL HISTORY:  I have reviewed this patient's social history and updated it with pertinent information if needed. Abimael Pope Giovanna  reports that he has quit smoking. His  smoking use included cigarettes. He has never used smokeless tobacco. He reports current alcohol use of about 7.0 standard drinks of alcohol per week. He reports that he does not use drugs.    PHYSICAL EXAM:  Pulse:  [65] 65  BP: (128)/(67) 128/67  SpO2:  [98 %] 98 %  185 lbs 1.6 oz    Constitutional: alert, no distress  Respiratory: Good bilateral air entry  Cardiovascular: Regular rate and rhythm, no murmurs  GI: nondistended  Neuropsychiatric: appropriate affact    ASSESSMENT: Pertinent issues addressed/ reviewed during this cardiology visit  Mild ascending aortic dilatation  Hypertension.     RECOMMENDATIONS:  Recent echocardiogram reviewed.  Ascending aorta dilatation remains stable at 4.4 cm.  Repeat echo in 1 year  Blood pressure is now well-controlled with current regimen.  He is asymptomatic from coronary disease point of view.  Continue statin and low-dose aspirin  Encouraged increased mobility    It was a pleasure seeing this patient in clinic today. Please do not hesitate to contact me with any future questions.     Ramón Fowler MD, [facility]  Cardiology - [facility]  January 31, 2024    Review of the result(s) of each unique test - Last ECG, CTA of the chest, BMP     The level of medical decision making during this visit was of moderate complexity.    The longitudinal plan of care for the diagnosis(es)/condition(s) as documented were addressed during this visit. Due to the added complexity in care, I will continue to support Abimael in the subsequent management and with ongoing continuity of care.     This note was completed in part using dictation via the Dragon voice recognition software. Some word and grammatical errors may occur and must be interpreted in the appropriate clinical context.  If there are any questions pertaining to this issue, please contact me for further clarification.      Thank you for allowing me to participate in the care of your patient.      Sincerely,     Ramón Fowler MD, MD     M  [facility]  cc:   Ramón Fowler MD  [address]  [place] [id number]

## 2025-02-24 NOTE — PROGRESS NOTES
CARDIOLOGY CLINIC CONSULTATION    [facility] [place]    HISTORY OF PRESENT ILLNESS:  The patient is a very pleasant 83-year-old gentleman with hypertension (untreated) and recently diagnosed ascending aortic dilatation and coronary atherosclerosis who is here for follow-up    He was seen in the emergency department last year with vasovagal syncope.  He has CTA of the chest during that ER visit which showed no aortic dissection but evidence of mild ascending aortic dilatation (measuring 4.1 cm) and coronary atherosclerosis.  His blood pressure was elevated during his ER visit.  He was subsequently asked to follow-up with us given aortic dilatation and elevated blood pressures.    He is not very active and does not exercise regularly.  However, with his usual activities he does not endorse chest pain or exertional shortness of breath.    PAST MEDICAL HISTORY:  Past Medical History:   Diagnosis Date    Diverticulitis        MEDICATIONS:  Current Outpatient Medications   Medication Sig Dispense Refill    acetaminophen (TYLENOL) 500 MG tablet take 1 tablet by mouth four times a day as needed for pain      atorvastatin (LIPITOR) 20 MG tablet Take 1 tablet (20 mg) by mouth at bedtime. 90 tablet 3    lisinopril (ZESTRIL) 10 MG tablet Take 1 tablet (10 mg) by mouth daily. 90 tablet 3    omeprazole (PRILOSEC) 20 MG DR capsule Take 1 capsule (20 mg) by mouth once daily.      VITAMIN D, CHOLECALCIFEROL, PO Take by mouth daily       No current facility-administered medications for this visit.       SOCIAL HISTORY:  I have reviewed this patient's social history and updated it with pertinent information if needed. Abimael Pope Giovanna  reports that he has quit smoking. His smoking use included cigarettes. He has never used smokeless tobacco. He reports current alcohol use of about 7.0 standard drinks of alcohol per week. He reports that he does not use drugs.    PHYSICAL EXAM:  Pulse:  [65] 65  BP:  (128)/(67) 128/67  SpO2:  [98 %] 98 %  185 lbs 1.6 oz    Constitutional: alert, no distress  Respiratory: Good bilateral air entry  Cardiovascular: Regular rate and rhythm, no murmurs  GI: nondistended  Neuropsychiatric: appropriate affact    ASSESSMENT: Pertinent issues addressed/ reviewed during this cardiology visit  Mild ascending aortic dilatation  Hypertension.     RECOMMENDATIONS:  Recent echocardiogram reviewed.  Ascending aorta dilatation remains stable at 4.4 cm.  Repeat echo in 1 year  Blood pressure is now well-controlled with current regimen.  He is asymptomatic from coronary disease point of view.  Continue statin and low-dose aspirin  Encouraged increased mobility    It was a pleasure seeing this patient in clinic today. Please do not hesitate to contact me with any future questions.     Ramón Fowelr MD, [facility]  Cardiology - [facility]  January 31, 2024    Review of the result(s) of each unique test - Last ECG, CTA of the chest, BMP     The level of medical decision making during this visit was of moderate complexity.    The longitudinal plan of care for the diagnosis(es)/condition(s) as documented were addressed during this visit. Due to the added complexity in care, I will continue to support Abimael in the subsequent management and with ongoing continuity of care.     This note was completed in part using dictation via the Dragon voice recognition software. Some word and grammatical errors may occur and must be interpreted in the appropriate clinical context.  If there are any questions pertaining to this issue, please contact me for further clarification.